# Patient Record
Sex: MALE | Race: BLACK OR AFRICAN AMERICAN | URBAN - METROPOLITAN AREA
[De-identification: names, ages, dates, MRNs, and addresses within clinical notes are randomized per-mention and may not be internally consistent; named-entity substitution may affect disease eponyms.]

---

## 2023-07-21 ENCOUNTER — NURSE TRIAGE (OUTPATIENT)
Dept: ADMINISTRATIVE | Facility: CLINIC | Age: 48
End: 2023-07-21

## 2023-07-21 NOTE — TELEPHONE ENCOUNTER
Pt currently located in [place].     Pt is calling about a prescription issue. He states the prescription is supposed to be at his pharmacy, and he was given the number to the nurse on call service by the MRI center. Unable to find any recent visits under the pt's medical record. Believe pt is calling the wrong facility. Advised that this line is for [facility] providers. Instructed to contact the MRI center and call back if he has further questions.    [employer]: [place] [facility] Status     Reason for Disposition   General information question, no triage required and triager able to answer question    Protocols used: Information Only Call - No Triage-A-